# Patient Record
Sex: FEMALE | Race: WHITE | NOT HISPANIC OR LATINO | URBAN - METROPOLITAN AREA
[De-identification: names, ages, dates, MRNs, and addresses within clinical notes are randomized per-mention and may not be internally consistent; named-entity substitution may affect disease eponyms.]

---

## 2023-09-21 PROBLEM — M32.9 SLE (SYSTEMIC LUPUS ERYTHEMATOSUS RELATED SYNDROME) (MULTI): Status: ACTIVE | Noted: 2023-09-21

## 2023-09-21 PROBLEM — G89.29 OTHER CHRONIC PAIN: Status: ACTIVE | Noted: 2023-09-21

## 2023-09-21 PROBLEM — M05.79 SEROPOSITIVE RHEUMATOID ARTHRITIS OF MULTIPLE JOINTS (MULTI): Status: ACTIVE | Noted: 2023-09-21

## 2023-09-21 PROBLEM — M32.9 SYSTEMIC LUPUS ERYTHEMATOSUS (MULTI): Status: ACTIVE | Noted: 2023-09-21

## 2023-09-21 PROBLEM — F41.9 ANXIETY: Status: ACTIVE | Noted: 2023-09-21

## 2023-09-21 PROBLEM — R93.89 ABNORMAL MRI: Status: ACTIVE | Noted: 2023-09-21

## 2023-09-21 PROBLEM — E55.9 VITAMIN D DEFICIENCY: Status: ACTIVE | Noted: 2023-09-21

## 2023-09-21 PROBLEM — M54.41 LUMBAGO WITH SCIATICA, RIGHT SIDE: Status: ACTIVE | Noted: 2023-09-21

## 2023-09-21 PROBLEM — K59.00 CONSTIPATION: Status: ACTIVE | Noted: 2023-09-21

## 2023-09-21 PROBLEM — M79.7 FIBROMYALGIA: Status: ACTIVE | Noted: 2023-09-21

## 2023-09-21 RX ORDER — DOCUSATE SODIUM 100 MG/1
CAPSULE, LIQUID FILLED ORAL
COMMUNITY

## 2023-09-21 RX ORDER — PREGABALIN 300 MG/1
CAPSULE ORAL
COMMUNITY
Start: 2023-03-20

## 2023-09-21 RX ORDER — SULFAMETHOXAZOLE AND TRIMETHOPRIM 800; 160 MG/1; MG/1
TABLET ORAL
COMMUNITY
Start: 2023-01-04

## 2023-09-21 RX ORDER — DEXTROAMPHETAMINE SACCHARATE, AMPHETAMINE ASPARTATE MONOHYDRATE, DEXTROAMPHETAMINE SULFATE AND AMPHETAMINE SULFATE 6.25; 6.25; 6.25; 6.25 MG/1; MG/1; MG/1; MG/1
CAPSULE, EXTENDED RELEASE ORAL
COMMUNITY

## 2023-09-21 RX ORDER — NITROFURANTOIN 25; 75 MG/1; MG/1
CAPSULE ORAL
COMMUNITY
Start: 2022-10-12

## 2023-09-21 RX ORDER — ALUMINUM CHLORIDE 20 %
SOLUTION, NON-ORAL TOPICAL
COMMUNITY

## 2023-09-21 RX ORDER — PREDNISONE 10 MG/1
TABLET ORAL
COMMUNITY
Start: 2023-06-12

## 2023-09-21 RX ORDER — CYCLOBENZAPRINE HCL 10 MG
TABLET ORAL
COMMUNITY
Start: 2022-12-30

## 2023-09-21 RX ORDER — NIRMATRELVIR AND RITONAVIR 300-100 MG
KIT ORAL
COMMUNITY
Start: 2022-12-20

## 2023-09-21 RX ORDER — PREGABALIN 200 MG/1
CAPSULE ORAL
COMMUNITY
Start: 2023-08-03 | End: 2024-02-02 | Stop reason: SDUPTHER

## 2023-09-21 RX ORDER — ONDANSETRON 4 MG/1
TABLET, FILM COATED ORAL
COMMUNITY
Start: 2022-08-31

## 2023-09-21 RX ORDER — LORAZEPAM 1 MG/1
TABLET ORAL
COMMUNITY

## 2023-09-21 RX ORDER — FOLIC ACID 1 MG/1
TABLET ORAL
COMMUNITY
Start: 2022-08-31 | End: 2024-02-02 | Stop reason: SDUPTHER

## 2023-09-21 RX ORDER — DULOXETIN HYDROCHLORIDE 20 MG/1
CAPSULE, DELAYED RELEASE ORAL
COMMUNITY
Start: 2023-08-02 | End: 2024-02-02 | Stop reason: SDUPTHER

## 2023-09-21 RX ORDER — METHOTREXATE 2.5 MG/1
TABLET ORAL
COMMUNITY
Start: 2022-08-31

## 2023-09-21 RX ORDER — HYDROXYCHLOROQUINE SULFATE 200 MG/1
TABLET, FILM COATED ORAL
COMMUNITY
End: 2024-02-02 | Stop reason: SDUPTHER

## 2023-09-21 RX ORDER — ACETAMINOPHEN 500 MG
TABLET ORAL
COMMUNITY

## 2023-09-21 RX ORDER — DULOXETIN HYDROCHLORIDE 60 MG/1
CAPSULE, DELAYED RELEASE ORAL
COMMUNITY

## 2024-02-02 ENCOUNTER — TELEPHONE (OUTPATIENT)
Dept: RHEUMATOLOGY | Facility: CLINIC | Age: 54
End: 2024-02-02
Payer: COMMERCIAL

## 2024-02-02 DIAGNOSIS — M35.3 POLYMYALGIA RHEUMATICA SYNDROME (MULTI): ICD-10-CM

## 2024-02-02 DIAGNOSIS — M45.9 ANKYLOSING SPONDYLITIS, UNSPECIFIED SITE OF SPINE (MULTI): ICD-10-CM

## 2024-02-02 NOTE — TELEPHONE ENCOUNTER
PT CALLED, STATES SHE IS WAITING FOR MD TO SCHEDULE A PHONE APPT IN MARCH WHEN SHE WILL HAVE INS AGAIN. ALSO REQUESTING REFILL OF FOLIC ACID

## 2024-02-02 NOTE — TELEPHONE ENCOUNTER
Patient called back and added HCQ and Duloxetine and Pregabalin pharmacy is [facility] in [address].

## 2024-02-08 NOTE — TELEPHONE ENCOUNTER
Please verify the doses she is taking for the medicines before refilling and also check if 30 or 90 day supply. thanks

## 2024-02-08 NOTE — TELEPHONE ENCOUNTER
Duloxetine is 90 day 20 mg every day  Folic acid  30 day 1 mg every day    mg  30 day BID   Lyrica 200 mg   30 day  1cap 1-3 hrs before bed

## 2024-02-08 NOTE — TELEPHONE ENCOUNTER
Patient  called would like Folic acid  sent to [facility] and she just picked up the last refill of the other medication so she does not need them today. Just folic acid

## 2024-02-17 RX ORDER — DULOXETIN HYDROCHLORIDE 20 MG/1
CAPSULE, DELAYED RELEASE ORAL
Qty: 90 CAPSULE | Refills: 3 | Status: SHIPPED | OUTPATIENT
Start: 2024-02-17

## 2024-02-17 RX ORDER — HYDROXYCHLOROQUINE SULFATE 200 MG/1
TABLET, FILM COATED ORAL
Qty: 60 TABLET | Refills: 11 | Status: SHIPPED | OUTPATIENT
Start: 2024-02-17

## 2024-02-17 RX ORDER — PREGABALIN 200 MG/1
CAPSULE ORAL
Qty: 90 CAPSULE | Refills: 3 | Status: SHIPPED | OUTPATIENT
Start: 2024-02-17

## 2024-02-17 RX ORDER — FOLIC ACID 1 MG/1
TABLET ORAL
Qty: 90 TABLET | Refills: 3 | Status: SHIPPED | OUTPATIENT
Start: 2024-02-17

## 2024-05-21 ENCOUNTER — TELEPHONE (OUTPATIENT)
Dept: RHEUMATOLOGY | Facility: CLINIC | Age: 54
End: 2024-05-21
Payer: COMMERCIAL

## 2024-05-21 DIAGNOSIS — M05.79 SEROPOSITIVE RHEUMATOID ARTHRITIS OF MULTIPLE JOINTS (MULTI): Primary | ICD-10-CM

## 2024-05-21 NOTE — TELEPHONE ENCOUNTER
Patient called left voice mail that she thinks she is in a Lupus flare and wanted to know what she can take. Please advise

## 2024-05-22 RX ORDER — METHYLPREDNISOLONE 4 MG/1
TABLET ORAL
Qty: 21 TABLET | Refills: 0 | Status: SHIPPED
Start: 2024-05-22 | End: 2024-05-22

## 2024-05-22 RX ORDER — METHYLPREDNISOLONE 4 MG/1
TABLET ORAL
Qty: 21 TABLET | Refills: 1 | Status: SHIPPED | OUTPATIENT
Start: 2024-05-22

## 2024-05-23 NOTE — TELEPHONE ENCOUNTER
Patient also stated that she has swollen gland just on the right.  And she also stated she has been waiting for a telephone call from October November. Please advise when we can put her on for follow up call.

## 2024-05-28 NOTE — TELEPHONE ENCOUNTER
5/24/24 CALLED PHARMACY & THEY HAD Rx. PT CALLED & HAD IT TRANSFERRED TO THE P[PHARMACY SHE WANTED

## 2024-08-05 ENCOUNTER — TELEPHONE (OUTPATIENT)
Dept: RHEUMATOLOGY | Facility: CLINIC | Age: 54
End: 2024-08-05
Payer: COMMERCIAL

## 2024-08-05 ENCOUNTER — TRANSCRIBE ORDERS (OUTPATIENT)
Dept: RHEUMATOLOGY | Facility: CLINIC | Age: 54
End: 2024-08-05
Payer: COMMERCIAL

## 2024-08-05 DIAGNOSIS — E55.9 VITAMIN D DEFICIENCY: ICD-10-CM

## 2024-08-05 DIAGNOSIS — Z79.899 ENCOUNTER FOR LONG-TERM (CURRENT) USE OF MEDICATIONS: ICD-10-CM

## 2024-08-05 DIAGNOSIS — M35.3 POLYMYALGIA RHEUMATICA SYNDROME (MULTI): ICD-10-CM

## 2024-08-05 DIAGNOSIS — M32.9 SLE (SYSTEMIC LUPUS ERYTHEMATOSUS RELATED SYNDROME) (MULTI): ICD-10-CM

## 2024-08-05 DIAGNOSIS — M05.79 SEROPOSITIVE RHEUMATOID ARTHRITIS OF MULTIPLE JOINTS (MULTI): ICD-10-CM

## 2024-08-05 DIAGNOSIS — M45.9 ANKYLOSING SPONDYLITIS, UNSPECIFIED SITE OF SPINE (MULTI): ICD-10-CM

## 2024-08-05 NOTE — TELEPHONE ENCOUNTER
PT CALLED WAS TO HAVE PHONE VISIT & NEVER GET A CALL.  WANTS TO R/S. ALSO WANTS RENEWAL FOR HANDICAP PLACARD HER PCP HAD GIVEN HER 5 YRS AGO

## 2024-08-19 ENCOUNTER — TELEPHONE (OUTPATIENT)
Dept: RHEUMATOLOGY | Facility: CLINIC | Age: 54
End: 2024-08-19
Payer: COMMERCIAL

## 2024-08-19 DIAGNOSIS — Z79.899 ENCOUNTER FOR LONG-TERM (CURRENT) USE OF MEDICATIONS: Primary | ICD-10-CM

## 2024-08-19 NOTE — TELEPHONE ENCOUNTER
PT CALLED VECTRA IS NOT COVERED ON INS, VIT D 25 NOR VIT D 1.25  NOT COVERED WITH E55.9  DIAGNOSIS CODE. PT WILL DECIDE WHETHER OR NOT TO GET VECTRA, HOW DESPERATE DO YOU WANT IT PER PT?

## 2024-08-20 NOTE — TELEPHONE ENCOUNTER
PT CALLED BACK TODAY & SAID TO FORGET ALL THE CALLS & MESSAGES FROM YESTERDAY. SHE HAD BLOOD WORK DONE THIS AM

## 2024-09-13 ENCOUNTER — TELEPHONE (OUTPATIENT)
Dept: RHEUMATOLOGY | Facility: CLINIC | Age: 54
End: 2024-09-13
Payer: COMMERCIAL

## 2024-09-13 DIAGNOSIS — M05.79 SEROPOSITIVE RHEUMATOID ARTHRITIS OF MULTIPLE JOINTS (MULTI): ICD-10-CM

## 2024-09-13 RX ORDER — METHYLPREDNISOLONE 4 MG/1
TABLET ORAL
Qty: 21 TABLET | Refills: 1 | Status: SHIPPED | OUTPATIENT
Start: 2024-09-13

## 2024-09-13 NOTE — TELEPHONE ENCOUNTER
Medrol dose pack sent for flares/as needed.   Re referral, it is out of system, need to send a fax if not done yet.

## 2024-09-13 NOTE — TELEPHONE ENCOUNTER
PT CALLED, FEELS LIKE IN A FLARE AGAIN, HAS MOUTH SORES, ROOF, GUMS , & LIPS. CAN YOU GIVE HER ANYTHING. ALSO STATES SHE YOU WERE GOING TO CALL IN [place]. ALSO YOU WERE REFERRING TO [facility] FOR [facility]? SHE HAS NOT HEARD ANYTHING FROM THEM. PLEASE ADVISE

## 2024-09-22 DIAGNOSIS — M35.3 POLYMYALGIA RHEUMATICA SYNDROME (MULTI): ICD-10-CM

## 2024-09-22 DIAGNOSIS — M45.9 ANKYLOSING SPONDYLITIS, UNSPECIFIED SITE OF SPINE (MULTI): ICD-10-CM

## 2024-09-23 ENCOUNTER — TELEPHONE (OUTPATIENT)
Dept: RHEUMATOLOGY | Facility: CLINIC | Age: 54
End: 2024-09-23
Payer: COMMERCIAL

## 2024-09-23 RX ORDER — PREGABALIN 200 MG/1
CAPSULE ORAL
Qty: 90 CAPSULE | Refills: 3 | Status: SHIPPED | OUTPATIENT
Start: 2024-09-23

## 2024-10-30 NOTE — TELEPHONE ENCOUNTER
There was a paper referral order done previously. Unable to find it in media tab. Please send referral request for [facility]- reason back pain. Thanks

## 2024-11-22 ENCOUNTER — TELEPHONE (OUTPATIENT)
Dept: RHEUMATOLOGY | Facility: CLINIC | Age: 54
End: 2024-11-22
Payer: COMMERCIAL

## 2025-01-09 RX ORDER — EZETIMIBE 10 MG/1
1 TABLET ORAL
COMMUNITY
Start: 2024-12-16

## 2025-01-09 RX ORDER — CHLORHEXIDINE GLUCONATE ORAL RINSE 1.2 MG/ML
SOLUTION DENTAL
COMMUNITY
Start: 2024-11-12

## 2025-01-09 RX ORDER — MIRTAZAPINE 15 MG/1
TABLET, FILM COATED ORAL
COMMUNITY
Start: 2024-08-21

## 2025-01-31 ENCOUNTER — OFFICE VISIT (OUTPATIENT)
Dept: RHEUMATOLOGY | Facility: CLINIC | Age: 55
End: 2025-01-31
Payer: COMMERCIAL

## 2025-01-31 VITALS
OXYGEN SATURATION: 99 % | HEART RATE: 76 BPM | BODY MASS INDEX: 27.81 KG/M2 | DIASTOLIC BLOOD PRESSURE: 85 MMHG | WEIGHT: 157 LBS | SYSTOLIC BLOOD PRESSURE: 120 MMHG

## 2025-01-31 DIAGNOSIS — R91.8 LUNG NODULES: ICD-10-CM

## 2025-01-31 DIAGNOSIS — M32.9 SLE (SYSTEMIC LUPUS ERYTHEMATOSUS RELATED SYNDROME) (MULTI): ICD-10-CM

## 2025-01-31 DIAGNOSIS — M05.79 SEROPOSITIVE RHEUMATOID ARTHRITIS OF MULTIPLE JOINTS (MULTI): Primary | ICD-10-CM

## 2025-01-31 DIAGNOSIS — E55.9 VITAMIN D DEFICIENCY: ICD-10-CM

## 2025-01-31 DIAGNOSIS — Z79.899 ENCOUNTER FOR LONG-TERM (CURRENT) USE OF MEDICATIONS: ICD-10-CM

## 2025-01-31 PROCEDURE — 99215 OFFICE O/P EST HI 40 MIN: CPT | Performed by: INTERNAL MEDICINE

## 2025-01-31 ASSESSMENT — ROUTINE ASSESSMENT OF PATIENT INDEX DATA (RAPID3)
WASH_DRY_BODY: WITHOUT ANY DIFFICULTY
IN_OUT_TRANSPORT: WITH MUCH DIFFICULTY
SEVERITY_SCORE: HIGH SEVERITY (HS)
SUM OF QUESTIONS A TO J: 13
WEIGHTED_TOTAL_SCORE: 5.1
PICK_CLOTHES_OFF_FLOOR: WITH MUCH DIFFICULTY
TURN_FAUCETS_OFF: WITHOUT ANY DIFFICULTY
WALK_KILOMETERS: UNABLE TO DO
FEELINGS_ANXIETY_NERVOUS: WITH SOME DIFFICULTY
ON A SCALE OF ONE TO TEN, CONSIDERING ALL THE WAYS IN WHICH ILLNESS AND HEALTH CONDITIONS MAY AFFECT YOU AT THIS TIME, PLEASE INDICATE BELOW HOW YOU ARE DOING:: 7.5
FEELINGS_DEPRESSION: WITH SOME DIFFICULTY
ON A SCALE OF ONE TO TEN, HOW MUCH PAIN HAVE YOU HAD BECAUSE OF YOUR CONDITION OVER THE PAST WEEK?: 3.5
FN_SCORE: 4.3
ON A SCALE OF ONE TO TEN, HOW MUCH PAIN HAVE YOU HAD BECAUSE OF YOUR CONDITION OVER THE PAST WEEK?: 3.5
ON A SCALE OF ONE TO TEN, CONSIDERING ALL THE WAYS IN WHICH ILLNESS AND HEALTH CONDITIONS MAY AFFECT YOU AT THIS TIME, PLEASE INDICATE BELOW HOW YOU ARE DOING:: 7.5
TOTAL RAPID3 SCORE: 15.3
WALK_FLAT_GROUND: WITH MUCH DIFFICULTY
DRESS_YOURSELF: WITHOUT ANY DIFFICULTY
LIFT_CUP_TO_MOUTH: WITHOUT ANY DIFFICULTY
PARTIPATE_RECREATIONAL_ACTIVITIES: UNABLE TO DO
IN_OUT_BED: WITH SOME DIFFICULTY
GOOD_NIGHTS_SLEEP: UNABLE TO DO
SEVERITY_SCORE: 0

## 2025-01-31 NOTE — PROGRESS NOTES
[facility]/  Rheumatology  [address], [address]  [place] [address]  Phone: [phone]  Fax: [phone]    Rheumatology Progress Note 01/31/2025     Elsi Hicks is a 54 y.o. female here for   Chief Complaint   Patient presents with    Follow-up    Med Refill       Last Visit:     Rheum Hx      HPI:     Patient states she was referred by PCP kelly Charles. Patient has seen Rheum in [facility].   SOB issues for awhile.   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  In last 6 months   Advair and Proair  6 min walk test- ok        Sores in mouth  Bloating issues  Colonoscopy years ago. Saw GI about 1 month ago.     Followed by Pulm Dr Kennedy- Mild persistent asthma, restrictive lung disease- on pulm tx and not on O2     Painful nodules- itchy      FHx: mother- scalp  Mother passed away from lung cancer in Jan2018 9/12/2018  RF 22.3   CCP neg    11/16/2018 AVISE  + Antiphophatidylserine/prothrombin IgG positive; IgM neg  Anti dsDNA positive; neg crithidia  JENSEN IgG strong positive  Fulfills criteria for RA and SLE.  Elevated protrombin IgG without rest of APS serologies or hx of VTE. Reassess and consider ASA 81 mg daily.  TPMT2 Specimen Whole Blood   TPMT Genotype *3A/Neg *   NUDT15 Genotype Neg/Neg   TPMT2 Interpretation See Note   Dose reduction of thiopurine drugs may be One decreased function allele was identified, suggesting   susceptibility to dose-related toxicity from standard doses   of thiopurine drugs.   Component      Latest Ref Rng & Units 11/12/2018 11/7/2018   MANISH SSA (RO) Ab      0 - 40 AU/mL 3     JEREMIAH-1 ANTIBODY      0 - 40 AU/mL 0     Anti-RNP      0 - 40 AU/mL 0     SAE1 Antibody      NA Negative     NXP-2 Antibody      NA Negative     MDA5 Antibody      NA Negative     TIF1-Gamma Antibody      NA Negative     MI-2 Antibody      Negative NA Negative     P155/140 Antibody      Negative NA Negative     PL-12 Antibody       Negative NA Negative     PL-7 Antibody      Negative NA Negative     OJ Antibody      Negative NA Negative     EJ Antibody      Negative NA Negative     SRP Antibody      Negative NA Negative     KU AB      Negative NA Negative     PM/Scl-100 Antibody, IgG      Negative NA Negative     U2 SMALL NUCLEAR RNP AB      Negative NA Negative     SSA-60AB, IGG      0 - 40 AU/mL 25     U3, RNP AB, IGG      Negative NA Negative     Myositis Panel Interpretation      NA See Note     Appearance      Clear NA   CLEAR   Color, UA      Lt. Yellow NA   YELLOW   Specific Gravity, Urine      1.005 - 1.030 NA   1.012   pH, Urine      5.0 - 8.0 NA   7.5   LEUKOCYTES, UA      Negative NA   1+   Nitrite, Urine      Negative NA   NEG   Total Protein, Urine      Negative mg/dL   NEG   Glucose, UA      Negative mg/dL   NEG   Ketones, Urine      Negative mg/dL   NEG   Urobilinogen, Urine      0 - 1 mg/dL   0.2   Bilirubin, Urine      Negative NA   NEG   Occult Blood,Urine      Negative RBC/uL   1+   Total Protein      6.3 - 8.2 g/dL 6.5     Albumin      3.1 - 5.2 g/dL 4.0     Alpha-1-Globulin      0.2 - 0.4 g/dL 0.3     Alpha 2 Globulin      0.5 - 1.0 g/dL 0.7     Beta      0.6 - 1.0 g/dL 0.7     Gamma      0.4 - 1.4 g/dL 0.8     SPE Interp.      NA Normal Pattern     REVIEWED BY      TORIE Sanchez,PhD     RELEASED BY      NA see below     WBC, UA      0 - 5 /[HPF]   5   RBC, UA      0 - 2 /[HPF]   5   Epithelial Cells      3 - 5 /[HPF]   TRACE   Bacteria, UA      Negative NA   NEGATIVE   Hyaline Casts, UA      0 - 1 /[LPF]   0   IgG 1      240 - 1118 mg/dL 306     IgG 2      124 - 549 mg/dL 365     IgG 3      21 - 134 mg/dL 31     IgG 4      1 - 123 mg/dL 25     C-ANCA      Not-Detected titer NOT DETECTED     p-ANCA Titer      Not-Detected titer NOT DETECTED     Angio Convert Enzyme      9 - 67 U/L 18     Vit D, 1,25-Dihydroxy      19.9 - 79.3 pg/mL 75.3     CRP      0.0 - 6.0 mg/L 16.9 (H)     Sed Rate      0 - 20 mm/h 18     Uric Acid       2.5 - 8.5 mg/dL 5.3     Total CK      30 - 170 U/L 92     Ferritin      8 - 252 ng/mL 22     Hepatitis B Surface Ag      Not-Detected NA NOT DETECTED     Hepatitis C Ab      Not-Detected NA NOT DETECTED     HIV 1+2 [id number] AG, EIA      Nonreactive NA NONREACTIVE     Quantiferon(r) TB Gold (Incubated)      Negative NA Negative     HLA-B27      Negative NA See Note        Comments:            The HLAB27 phenotyping result by flow cytometry is             indeterminate.              Ankylosing Spondylitis (HLA-B27) Genotyping     Ankylosing Spondylitis (HLAB27) Specimen: Whole Blood   Ankylosing Spondylitis (HLAB27): Negative     Indication for testing: Assess genetic risk for ankylosing   spondylitis.     The sample is negative for HLA-B27.     Previous Tx  HCQ- since 2019  Gabapentin  Lyrica    Health Maintenance  DXA  Malignancy Hx  Immunization History   Administered Date(s) Administered    COVID-19, mRNA, LNP-S, PF, 30 mcg/0.3 mL dose 12/01/2021    Pfizer Purple Cap SARS-CoV-2 03/31/2021, 04/21/2021    Zoster vaccine, recombinant, adult (SHINGRIX) 06/01/2018, 06/15/2018, 11/01/2018          Past Medical History:   Diagnosis Date    Asthma action plan declined (HHS-HCC)     Activity induced    Bipolar 1 disorder (Multi)     Fibromyalgia     Lupus     PTSD (post-traumatic stress disorder)     Rheumatoid arthritis       Past Surgical History:   Procedure Laterality Date    CHOLECYSTECTOMY      ENDOMETRIAL ABLATION      HYSTERECTOMY      KNEE ARTHROSCOPY W/ MENISCAL REPAIR Left     SALPINGECTOMY        Current Outpatient Medications   Medication Sig Dispense Refill    aluminum chloride (Drysol Dab-O-Matic) 20 % external solution 1 application at bedtime Externally      amphetamine-dextroamphetamine XR (Adderall XR) 15 mg 24 hr capsule Take 1 capsule (15 mg) by mouth once daily in the morning. Take before meals.      amphetamine-dextroamphetamine XR (Adderall XR) 25 mg 24 hr capsule 1 capsule in the morning Orally  Once a day      chlorhexidine (Peridex) 0.12 % solution       cholecalciferol (Vitamin D-3) 50 mcg (2,000 unit) capsule 1 capsule Orally Once a day for 30 day(s)      cyclobenzaprine (Flexeril) 10 mg tablet 1 tablet at bedtime as needed Orally Once a day for 30 day(s)      docusate sodium (Colace) 100 mg capsule 1 capsule as needed Orally Once a day for 30 day(s)      DULoxetine (Cymbalta) 20 mg DR capsule 1 capsule Orally Once a day for 90 day(s) 90 capsule 3    DULoxetine (Cymbalta) 60 mg DR capsule 1 capsule Orally Once a day for 30 day(s)      estradiol (Estrace) 1 mg tablet       ezetimibe (Zetia) 10 mg tablet Take 1 tablet (10 mg) by mouth early in the morning..      folic acid (Folvite) 1 mg tablet 1 tablet Orally Once a day for 30 day(s) 90 tablet 3    hydroxychloroquine (PlaqueniL) 200 mg tablet 200 mg Orally twice daily for 30 days 60 tablet 11    LORazepam (Ativan) 1 mg tablet 1 tablet at bedtime as needed Orally Once a day      methotrexate (Trexall) 2.5 mg tablet 6 tablets (15 mg) Orally every 7 days      methylPREDNISolone (Medrol Dospak) 4 mg tablets Follow schedule on package instructions 21 tablet 1    mirtazapine (Remeron) 15 mg tablet       nirmatrelvir-ritonavir (Paxlovid) 300 mg (150 mg x 2)-100 mg tablet therapy pack 2 tablets nirmatrelvir and 1 tablet ritonavir Orally twice daily for 5 days      nitrofurantoin, macrocrystal-monohydrate, (Macrobid) 100 mg capsule 1 capsule with food Orally twice a day for 5 day(s)      ondansetron (Zofran) 4 mg tablet 1 tablet Orally every 8 hours as needed for nausea for 30 day(s)      predniSONE (Deltasone) 10 mg tablet 1 tablet Orally Once a day for 30 day(s)      pregabalin (Lyrica) 200 mg capsule TAKE 1 CAPSULE BY MOUTH EVERY NIGHT 1 TO 3 HOURS BEFORE BEDTIME 90 capsule 3    pregabalin (Lyrica) 300 mg capsule 1 capsule in the evening 1 to 3 hours before bedtime Orally Twice a day for 30 days      sulfamethoxazole-trimethoprim (Bactrim DS) 800-160 mg tablet  "1 tablet Orally Twice a day for 7 day(s)       No current facility-administered medications for this visit.      Allergies   Allergen Reactions    Adhesive Unknown    Belimumab Unknown    Hydroxychloroquine Hives    Levetiracetam Unknown and Other     Balance and mood issues   Balance and Mood    Balance and mood issues    Balance and Mood    Lidocaine Unknown, Headache and Other     headaches   Headache and Sore Throat    headaches    Headache and Sore Throat    Oxycodone Nausea/vomiting    Oxycodone-Acetaminophen Unknown, Headache, Nausea/vomiting and Other     Severe headches, throwing up   Severe headches, throwing up    Severe headches, throwing up    Silver Other     Pt came in for a nuclear bone scan injection, was dressed with tegaderm when she left. When pt returned for her imaging 3 hrs later she showed the nuclear tech her arm where the tegaderm was applied and noticed how red the RAC area was & it was stinging and burning, no rash and no itching. She wanted the tech to document in her chart this reaction, I advised her also to let her doctor know of this reaction as well.     Pt came in for a nuclear bone scan injection, was dressed with tegaderm when she left. When pt returned for her imaging 3 hrs later she showed the nuclear tech her arm where the tegaderm was applied and noticed how red the RAC area was & it was stinging and burning, no rash and no itching. She wanted the tech to document in her chart this reaction, I advised her also to let her doctor know of this reaction as well.    Wound Dressings Unknown    Ziprasidone Hcl Unknown    Desmopressin Unknown and Rash     Motor hesitation   Motor hesitation    Motor hesitation    Oxcarbazepine Rash     Other reaction(s): Unknown    Ziprasidone Rash, Unknown, Other and Seizure     Began seizures   Other reaction(s): Unknown    Other reaction(s): Other: See Comments   \"seizures\"   \"seizures\"    \"seizures\"        Visit Vitals  /85   Pulse 76   Wt " 71.2 kg (157 lb)   SpO2 99%   BMI 27.81 kg/m²   Smoking Status Never   BSA 1.78 m²              Rapid 3  Function Score (FN): 4.3  Pain Score (PN) (0-10): 3.5  Patient Global (PTGL) (0-10): 7.5  Rapid3 Score: 15.3  RAPID3 Weighted Score: 5.1       Workup  1/8/25  WBC wnl  UA neg blood or prot  ESR 4  C3 121  C4 28  ACE 44  B6 18.2  Vit D1,25 63.2  dsDNA 1  IL-6 <2.5  Vectra 34 (moderate)  Uric acid 4.3    SHAN- no M protein  IgG subclasses IgG1 borderline low 244  IgG2 309, IgG3 33, IgG4 22  Creat 1.24, GFR 51  ALT 64, AST 47, Alk phos 89    Assessment/Plan  1. Seropositive rheumatoid arthritis of multiple joints (Multi)    2. SLE (systemic lupus erythematosus related syndrome) (Multi)    3. Vitamin D deficiency    4. Encounter for long-term (current) use of medications       No orders of the defined types were placed in this encounter.             Since last appt, adherent and tolerating  mg daily  Started on Zetia by PCP in Dec for high cholesterol. Told to stay off of it.  Following with Nephrology- for possible biopsy.  Saw Ortho and had MRIs and EM   Denies any recent or current infection.  Not on any NSAIDs or glucocorticoids.  ROS+ for   Rapid 3 consistent with high severity.  Labs reviewed  D/w pt and decided on tx options.  Advised of possible side effects and importance of monitoring.   All questions answered.  Patient to follow up with primary care provider regarding all other medical issues not addressed today and for medical chart updating.    Svetlana Painting MD      No care team member to display

## 2025-02-13 ENCOUNTER — TELEPHONE (OUTPATIENT)
Dept: RHEUMATOLOGY | Facility: CLINIC | Age: 55
End: 2025-02-13
Payer: COMMERCIAL

## 2025-02-13 DIAGNOSIS — M35.3 POLYMYALGIA RHEUMATICA SYNDROME (MULTI): ICD-10-CM

## 2025-02-13 DIAGNOSIS — M45.9 ANKYLOSING SPONDYLITIS, UNSPECIFIED SITE OF SPINE (MULTI): ICD-10-CM

## 2025-02-18 ENCOUNTER — SPECIALTY PHARMACY (OUTPATIENT)
Dept: PHARMACY | Facility: CLINIC | Age: 55
End: 2025-02-18

## 2025-02-18 RX ORDER — PREGABALIN 200 MG/1
CAPSULE ORAL
Qty: 90 CAPSULE | Refills: 3 | Status: SHIPPED | OUTPATIENT
Start: 2025-02-18 | End: 2025-02-21 | Stop reason: SDUPTHER

## 2025-02-18 RX ORDER — FOLIC ACID 1 MG/1
TABLET ORAL
Qty: 90 TABLET | Refills: 3 | Status: SHIPPED | OUTPATIENT
Start: 2025-02-18

## 2025-02-18 RX ORDER — HYDROXYCHLOROQUINE SULFATE 200 MG/1
TABLET, FILM COATED ORAL
Qty: 180 TABLET | Refills: 1 | Status: SHIPPED | OUTPATIENT
Start: 2025-02-18

## 2025-02-19 ENCOUNTER — SPECIALTY PHARMACY (OUTPATIENT)
Dept: PHARMACY | Facility: CLINIC | Age: 55
End: 2025-02-19

## 2025-02-20 ENCOUNTER — TELEPHONE (OUTPATIENT)
Dept: RHEUMATOLOGY | Facility: CLINIC | Age: 55
End: 2025-02-20
Payer: COMMERCIAL

## 2025-02-20 DIAGNOSIS — M45.9 ANKYLOSING SPONDYLITIS, UNSPECIFIED SITE OF SPINE (MULTI): ICD-10-CM

## 2025-02-20 DIAGNOSIS — M35.3 POLYMYALGIA RHEUMATICA SYNDROME (MULTI): ICD-10-CM

## 2025-02-21 RX ORDER — PREGABALIN 200 MG/1
CAPSULE ORAL
Qty: 90 CAPSULE | Refills: 3 | Status: SHIPPED | OUTPATIENT
Start: 2025-02-21

## 2025-02-21 NOTE — TELEPHONE ENCOUNTER
----- Message from Nurse Maria C BACA sent at 2/19/2025  2:34 PM EST -----  Regarding: FW: Patient TD    ----- Message -----  From: Damaris A Weiland, PharmD  Sent: 2/19/2025   1:47 PM EST  To: Maria C Humphrey LPN; [facility] Rheumatology Team  Subject: Patient TD                                       Good afternoon,    The patient is requesting her Lyrica RX be sent to [id number] -   [address] [address]  Phone: [phone]  Fax: [phone]    We cannot transfer this to [facility] since this is a controlled substance and has never been filled. Can you please resend to [facility]?    Thanks!  Damaris Damaris Weiland, PharmD, [facility]  Clinical Pharmacist  [address], [place] [address]  Phone: [phone]  Fax: [phone]

## 2025-05-30 ENCOUNTER — APPOINTMENT (OUTPATIENT)
Dept: RHEUMATOLOGY | Facility: CLINIC | Age: 55
End: 2025-05-30
Payer: COMMERCIAL

## 2025-06-04 ENCOUNTER — APPOINTMENT (OUTPATIENT)
Dept: RHEUMATOLOGY | Facility: CLINIC | Age: 55
End: 2025-06-04
Payer: COMMERCIAL

## 2025-06-11 ENCOUNTER — APPOINTMENT (OUTPATIENT)
Dept: RHEUMATOLOGY | Facility: CLINIC | Age: 55
End: 2025-06-11
Payer: COMMERCIAL

## 2025-06-12 ENCOUNTER — APPOINTMENT (OUTPATIENT)
Facility: CLINIC | Age: 55
End: 2025-06-12
Payer: COMMERCIAL

## 2025-07-10 LAB
1,25(OH)2D SERPL-MCNC: 26 PG/ML (ref 18–72)
1,25(OH)2D2 SERPL-MCNC: <8 PG/ML
1,25(OH)2D3 SERPL-MCNC: 26 PG/ML
25(OH)D3+25(OH)D2 SERPL-MCNC: 57 NG/ML (ref 30–100)
ACE SERPL-CCNC: 23 U/L (ref 9–67)
ALBUMIN SERPL-MCNC: 4.5 G/DL (ref 3.6–5.1)
ALP SERPL-CCNC: 77 U/L (ref 37–153)
ALT SERPL-CCNC: 28 U/L (ref 6–29)
ANION GAP SERPL CALCULATED.4IONS-SCNC: 7 MMOL/L (CALC) (ref 7–17)
AST SERPL-CCNC: 27 U/L (ref 10–35)
BASOPHILS # BLD AUTO: 39 CELLS/UL (ref 0–200)
BASOPHILS NFR BLD AUTO: 0.7 %
BILIRUB SERPL-MCNC: 0.5 MG/DL (ref 0.2–1.2)
BUN SERPL-MCNC: 14 MG/DL (ref 7–25)
C3 SERPL-MCNC: 134 MG/DL (ref 83–193)
C4 SERPL-MCNC: 26 MG/DL (ref 15–57)
CALCIUM SERPL-MCNC: 9.6 MG/DL (ref 8.6–10.4)
CHLORIDE SERPL-SCNC: 105 MMOL/L (ref 98–110)
CK SERPL-CCNC: 101 U/L (ref 21–240)
CO2 SERPL-SCNC: 28 MMOL/L (ref 20–32)
CREAT SERPL-MCNC: 1.06 MG/DL (ref 0.5–1.03)
CRP SERPL-MCNC: <3 MG/L
DSDNA AB SER-ACNC: 1 IU/ML
EGFRCR SERPLBLD CKD-EPI 2021: 61 ML/MIN/1.73M2
EOSINOPHIL # BLD AUTO: 129 CELLS/UL (ref 15–500)
EOSINOPHIL NFR BLD AUTO: 2.3 %
ERYTHROCYTE [DISTWIDTH] IN BLOOD BY AUTOMATED COUNT: 12.7 % (ref 11–15)
ERYTHROCYTE [SEDIMENTATION RATE] IN BLOOD BY WESTERGREN METHOD: 6 MM/H
GLUCOSE SERPL-MCNC: 82 MG/DL (ref 65–139)
HCT VFR BLD AUTO: 42.7 % (ref 35–45)
HGB BLD-MCNC: 14.2 G/DL (ref 11.7–15.5)
IGG SERPL-MCNC: 618 MG/DL (ref 600–1640)
IGG1 SER-MCNC: 262 MG/DL (ref 382–929)
IGG2 SER-MCNC: 310 MG/DL (ref 241–700)
IGG3 SER-MCNC: 34 MG/DL (ref 22–178)
IGG4 SER-MCNC: 29.3 MG/DL (ref 4–86)
IL6 SERPL-MCNC: 1.98 PG/ML
LYMPHOCYTES # BLD AUTO: 2229 CELLS/UL (ref 850–3900)
LYMPHOCYTES NFR BLD AUTO: 39.8 %
MCH RBC QN AUTO: 32.3 PG (ref 27–33)
MCHC RBC AUTO-ENTMCNC: 33.3 G/DL (ref 32–36)
MCV RBC AUTO: 97 FL (ref 80–100)
MONOCYTES # BLD AUTO: 314 CELLS/UL (ref 200–950)
MONOCYTES NFR BLD AUTO: 5.6 %
NEUTROPHILS # BLD AUTO: 2890 CELLS/UL (ref 1500–7800)
NEUTROPHILS NFR BLD AUTO: 51.6 %
PLATELET # BLD AUTO: 215 THOUSAND/UL (ref 140–400)
PMV BLD REES-ECKER: 10.2 FL (ref 7.5–12.5)
POTASSIUM SERPL-SCNC: 4.5 MMOL/L (ref 3.5–5.3)
PROT SERPL-MCNC: 6.5 G/DL (ref 6.1–8.1)
RBC # BLD AUTO: 4.4 MILLION/UL (ref 3.8–5.1)
SODIUM SERPL-SCNC: 140 MMOL/L (ref 135–146)
URATE SERPL-MCNC: 5.5 MG/DL (ref 2.5–7)
WBC # BLD AUTO: 5.6 THOUSAND/UL (ref 3.8–10.8)

## 2025-07-18 ENCOUNTER — OFFICE VISIT (OUTPATIENT)
Facility: CLINIC | Age: 55
End: 2025-07-18
Payer: COMMERCIAL

## 2025-07-18 VITALS
DIASTOLIC BLOOD PRESSURE: 72 MMHG | SYSTOLIC BLOOD PRESSURE: 113 MMHG | WEIGHT: 155 LBS | HEIGHT: 63 IN | BODY MASS INDEX: 27.46 KG/M2 | HEART RATE: 68 BPM

## 2025-07-18 DIAGNOSIS — M05.79 SEROPOSITIVE RHEUMATOID ARTHRITIS OF MULTIPLE JOINTS (MULTI): Primary | ICD-10-CM

## 2025-07-18 DIAGNOSIS — M35.3 POLYMYALGIA RHEUMATICA SYNDROME (MULTI): ICD-10-CM

## 2025-07-18 DIAGNOSIS — M45.9 ANKYLOSING SPONDYLITIS, UNSPECIFIED SITE OF SPINE (MULTI): ICD-10-CM

## 2025-07-18 DIAGNOSIS — Z79.899 ENCOUNTER FOR LONG-TERM (CURRENT) USE OF MEDICATIONS: ICD-10-CM

## 2025-07-18 DIAGNOSIS — E55.9 VITAMIN D DEFICIENCY: ICD-10-CM

## 2025-07-18 PROCEDURE — 99215 OFFICE O/P EST HI 40 MIN: CPT | Performed by: INTERNAL MEDICINE

## 2025-07-18 PROCEDURE — 2500000004 HC RX 250 GENERAL PHARMACY W/ HCPCS (ALT 636 FOR OP/ED): Mod: JZ | Performed by: INTERNAL MEDICINE

## 2025-07-18 PROCEDURE — 3008F BODY MASS INDEX DOCD: CPT | Performed by: INTERNAL MEDICINE

## 2025-07-18 PROCEDURE — 96372 THER/PROPH/DIAG INJ SC/IM: CPT | Performed by: INTERNAL MEDICINE

## 2025-07-18 RX ORDER — METHYLPREDNISOLONE ACETATE 80 MG/ML
80 INJECTION, SUSPENSION INTRA-ARTICULAR; INTRALESIONAL; INTRAMUSCULAR; SOFT TISSUE ONCE
Status: COMPLETED | OUTPATIENT
Start: 2025-07-18 | End: 2025-07-18

## 2025-07-18 RX ORDER — DULOXETIN HYDROCHLORIDE 20 MG/1
CAPSULE, DELAYED RELEASE ORAL
Qty: 90 CAPSULE | Refills: 3 | Status: SHIPPED | OUTPATIENT
Start: 2025-07-18

## 2025-07-18 RX ORDER — HYDROXYCHLOROQUINE SULFATE 200 MG/1
200 TABLET, FILM COATED ORAL DAILY
Qty: 90 TABLET | Refills: 3 | Status: SHIPPED | OUTPATIENT
Start: 2025-07-18

## 2025-07-18 RX ADMIN — METHYLPREDNISOLONE ACETATE 80 MG: 80 INJECTION, SUSPENSION INTRA-ARTICULAR; INTRALESIONAL; INTRAMUSCULAR; SOFT TISSUE at 16:28

## 2025-07-18 ASSESSMENT — ROUTINE ASSESSMENT OF PATIENT INDEX DATA (RAPID3)
GOOD_NIGHTS_SLEEP: UNABLE TO DO
ON A SCALE OF ONE TO TEN, HOW MUCH PAIN HAVE YOU HAD BECAUSE OF YOUR CONDITION OVER THE PAST WEEK?: 4.5
DRESS_YOURSELF: WITHOUT ANY DIFFICULTY
ON A SCALE OF ONE TO TEN, CONSIDERING ALL THE WAYS IN WHICH ILLNESS AND HEALTH CONDITIONS MAY AFFECT YOU AT THIS TIME, PLEASE INDICATE BELOW HOW YOU ARE DOING:: 5
IN_OUT_BED: WITH SOME DIFFICULTY
FEELINGS_ANXIETY_NERVOUS: WITH SOME DIFFICULTY
FN_SCORE: 3.7
TOTAL RAPID3 SCORE: 13.2
WALK_KILOMETERS: WITH MUCH DIFFICULTY
IN_OUT_TRANSPORT: WITH SOME DIFFICULTY
FEELINGS_DEPRESSION: WITH SOME DIFFICULTY
WEIGHTED_TOTAL_SCORE: 4.4
WALK_FLAT_GROUND: WITH MUCH DIFFICULTY
PICK_CLOTHES_OFF_FLOOR: WITH SOME DIFFICULTY
SEVERITY_SCORE: 0
SEVERITY_SCORE: HIGH SEVERITY (HS)
ON A SCALE OF ONE TO TEN, CONSIDERING ALL THE WAYS IN WHICH ILLNESS AND HEALTH CONDITIONS MAY AFFECT YOU AT THIS TIME, PLEASE INDICATE BELOW HOW YOU ARE DOING:: 5
WASH_DRY_BODY: WITH SOME DIFFICULTY
PARTIPATE_RECREATIONAL_ACTIVITIES: UNABLE TO DO
LIFT_CUP_TO_MOUTH: WITHOUT ANY DIFFICULTY
SUM OF QUESTIONS A TO J: 11
TURN_FAUCETS_OFF: WITHOUT ANY DIFFICULTY
ON A SCALE OF ONE TO TEN, HOW MUCH PAIN HAVE YOU HAD BECAUSE OF YOUR CONDITION OVER THE PAST WEEK?: 4.5

## 2025-07-18 ASSESSMENT — PATIENT HEALTH QUESTIONNAIRE - PHQ9
SUM OF ALL RESPONSES TO PHQ9 QUESTIONS 1 AND 2: 0
2. FEELING DOWN, DEPRESSED OR HOPELESS: NOT AT ALL
1. LITTLE INTEREST OR PLEASURE IN DOING THINGS: NOT AT ALL

## 2025-07-18 ASSESSMENT — ENCOUNTER SYMPTOMS
LOSS OF SENSATION IN FEET: 0
OCCASIONAL FEELINGS OF UNSTEADINESS: 0
DEPRESSION: 0

## 2025-07-18 ASSESSMENT — PAIN SCALES - GENERAL: PAINLEVEL_OUTOF10: 4

## 2025-07-18 ASSESSMENT — COLUMBIA-SUICIDE SEVERITY RATING SCALE - C-SSRS
6. HAVE YOU EVER DONE ANYTHING, STARTED TO DO ANYTHING, OR PREPARED TO DO ANYTHING TO END YOUR LIFE?: NO
2. HAVE YOU ACTUALLY HAD ANY THOUGHTS OF KILLING YOURSELF?: NO
1. IN THE PAST MONTH, HAVE YOU WISHED YOU WERE DEAD OR WISHED YOU COULD GO TO SLEEP AND NOT WAKE UP?: NO

## 2025-07-18 NOTE — PROGRESS NOTES
[facility]/  Rheumatology  [address], [address]  [place] [address]  Phone: [phone]  Fax: [phone]    Rheumatology Progress Note 07/18/2025     Elsi Hicks is a 55 y.o. female here for   Chief Complaint   Patient presents with    Follow-up       Last Visit: 1/31/2025      Rheum Hx      HPI:     Patient states she was referred by PCP kelly Charles. Patient has seen Rheum in [facility].   SOB issues for awhile.   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  In last 6 months   Advair and Proair  6 min walk test- ok        Sores in mouth  Bloating issues  Colonoscopy years ago. Saw GI about 1 month ago.     Followed by Pulm Dr Kennedy- Mild persistent asthma, restrictive lung disease- on pulm tx and not on O2     Painful nodules- itchy      FHx: mother- scalp  Mother passed away from lung cancer in Jan2018 9/12/2018  RF 22.3   CCP neg    11/16/2018 AVISE  + Antiphophatidylserine/prothrombin IgG positive; IgM neg  Anti dsDNA positive; neg crithidia  JENSEN IgG strong positive  Fulfills criteria for RA and SLE.  Elevated protrombin IgG without rest of APS serologies or hx of VTE. Reassess and consider ASA 81 mg daily.  TPMT2 Specimen Whole Blood   TPMT Genotype *3A/Neg *   NUDT15 Genotype Neg/Neg   TPMT2 Interpretation See Note   Dose reduction of thiopurine drugs may be One decreased function allele was identified, suggesting   susceptibility to dose-related toxicity from standard doses   of thiopurine drugs.   Component      Latest Ref Rng & Units 11/12/2018 11/7/2018   MANISH SSA (RO) Ab      0 - 40 AU/mL 3     JEREMIAH-1 ANTIBODY      0 - 40 AU/mL 0     Anti-RNP      0 - 40 AU/mL 0     SAE1 Antibody      NA Negative     NXP-2 Antibody      NA Negative     MDA5 Antibody      NA Negative     TIF1-Gamma Antibody      NA Negative     MI-2 Antibody      Negative NA Negative     P155/140 Antibody      Negative NA Negative     PL-12 Antibody       Negative NA Negative     PL-7 Antibody      Negative NA Negative     OJ Antibody      Negative NA Negative     EJ Antibody      Negative NA Negative     SRP Antibody      Negative NA Negative     KU AB      Negative NA Negative     PM/Scl-100 Antibody, IgG      Negative NA Negative     U2 SMALL NUCLEAR RNP AB      Negative NA Negative     SSA-60AB, IGG      0 - 40 AU/mL 25     U3, RNP AB, IGG      Negative NA Negative     Myositis Panel Interpretation      NA See Note     Appearance      Clear NA   CLEAR   Color, UA      Lt. Yellow NA   YELLOW   Specific Gravity, Urine      1.005 - 1.030 NA   1.012   pH, Urine      5.0 - 8.0 NA   7.5   LEUKOCYTES, UA      Negative NA   1+   Nitrite, Urine      Negative NA   NEG   Total Protein, Urine      Negative mg/dL   NEG   Glucose, UA      Negative mg/dL   NEG   Ketones, Urine      Negative mg/dL   NEG   Urobilinogen, Urine      0 - 1 mg/dL   0.2   Bilirubin, Urine      Negative NA   NEG   Occult Blood,Urine      Negative RBC/uL   1+   Total Protein      6.3 - 8.2 g/dL 6.5     Albumin      3.1 - 5.2 g/dL 4.0     Alpha-1-Globulin      0.2 - 0.4 g/dL 0.3     Alpha 2 Globulin      0.5 - 1.0 g/dL 0.7     Beta      0.6 - 1.0 g/dL 0.7     Gamma      0.4 - 1.4 g/dL 0.8     SPE Interp.      NA Normal Pattern     REVIEWED BY      TORIE Sanchez,PhD     RELEASED BY      NA see below     WBC, UA      0 - 5 /[HPF]   5   RBC, UA      0 - 2 /[HPF]   5   Epithelial Cells      3 - 5 /[HPF]   TRACE   Bacteria, UA      Negative NA   NEGATIVE   Hyaline Casts, UA      0 - 1 /[LPF]   0   IgG 1      240 - 1118 mg/dL 306     IgG 2      124 - 549 mg/dL 365     IgG 3      21 - 134 mg/dL 31     IgG 4      1 - 123 mg/dL 25     C-ANCA      Not-Detected titer NOT DETECTED     p-ANCA Titer      Not-Detected titer NOT DETECTED     Angio Convert Enzyme      9 - 67 U/L 18     Vit D, 1,25-Dihydroxy      19.9 - 79.3 pg/mL 75.3     CRP      0.0 - 6.0 mg/L 16.9 (H)     Sed Rate      0 - 20 mm/h 18     Uric Acid       2.5 - 8.5 mg/dL 5.3     Total CK      30 - 170 U/L 92     Ferritin      8 - 252 ng/mL 22     Hepatitis B Surface Ag      Not-Detected NA NOT DETECTED     Hepatitis C Ab      Not-Detected NA NOT DETECTED     HIV 1+2 [id number] AG, EIA      Nonreactive NA NONREACTIVE     Quantiferon(r) TB Gold (Incubated)      Negative NA Negative     HLA-B27      Negative NA See Note        Comments:            The HLAB27 phenotyping result by flow cytometry is             indeterminate.              Ankylosing Spondylitis (HLA-B27) Genotyping     Ankylosing Spondylitis (HLAB27) Specimen: Whole Blood   Ankylosing Spondylitis (HLAB27): Negative     Indication for testing: Assess genetic risk for ankylosing   spondylitis.     The sample is negative for HLA-B27.     Previous Tx  HCQ- since 2019  Gabapentin  Lyrica    Health Maintenance  DXA  Malignancy Hx  Immunization History   Administered Date(s) Administered    COVID-19, mRNA, LNP-S, PF, 30 mcg/0.3 mL dose 03/31/2021, 04/21/2021, 12/01/2021    Zoster vaccine, recombinant, adult (SHINGRIX) 06/01/2018, 06/15/2018, 11/01/2018          Past Medical History:   Diagnosis Date    Asthma action plan declined ([facility])     Activity induced    Bipolar 1 disorder (Multi)     Fibromyalgia     Lupus     PTSD (post-traumatic stress disorder)     Rheumatoid arthritis       Past Surgical History:   Procedure Laterality Date    CHOLECYSTECTOMY      ENDOMETRIAL ABLATION      HYSTERECTOMY      KNEE ARTHROSCOPY W/ MENISCAL REPAIR Left     SALPINGECTOMY        Current Outpatient Medications   Medication Sig Dispense Refill    aluminum chloride (Drysol Dab-O-Matic) 20 % external solution 1 application at bedtime Externally      amphetamine-dextroamphetamine XR (Adderall XR) 15 mg 24 hr capsule Take 1 capsule (15 mg) by mouth once daily in the morning. Take before meals.      amphetamine-dextroamphetamine XR (Adderall XR) 25 mg 24 hr capsule 1 capsule in the morning Orally Once a day      chlorhexidine  (Peridex) 0.12 % solution       cholecalciferol (Vitamin D-3) 50 mcg (2,000 unit) capsule 1 capsule Orally Once a day for 30 day(s)      cyclobenzaprine (Flexeril) 10 mg tablet 1 tablet at bedtime as needed Orally Once a day for 30 day(s)      docusate sodium (Colace) 100 mg capsule 1 capsule as needed Orally Once a day for 30 day(s)      DULoxetine (Cymbalta) 20 mg DR capsule 1 capsule Orally Once a day for 90 day(s) 90 capsule 3    DULoxetine (Cymbalta) 60 mg DR capsule 1 capsule Orally Once a day for 30 day(s)      estradiol (Estrace) 1 mg tablet       ezetimibe (Zetia) 10 mg tablet Take 1 tablet (10 mg) by mouth early in the morning..      folic acid (Folvite) 1 mg tablet TAKE 1 TABLET BY MOUTH DAILY 90 tablet 3    hydroxychloroquine (Plaquenil) 200 mg tablet TAKE 1 TABLET BY MOUTH TWICE DAILY 180 tablet 1    LORazepam (Ativan) 1 mg tablet 1 tablet at bedtime as needed Orally Once a day      methotrexate (Trexall) 2.5 mg tablet 6 tablets (15 mg) Orally every 7 days      methylPREDNISolone (Medrol Dospak) 4 mg tablets Follow schedule on package instructions 21 tablet 1    mirtazapine (Remeron) 15 mg tablet       nirmatrelvir-ritonavir (Paxlovid) 300 mg (150 mg x 2)-100 mg tablet therapy pack 2 tablets nirmatrelvir and 1 tablet ritonavir Orally twice daily for 5 days      nitrofurantoin, macrocrystal-monohydrate, (Macrobid) 100 mg capsule 1 capsule with food Orally twice a day for 5 day(s)      ondansetron (Zofran) 4 mg tablet 1 tablet Orally every 8 hours as needed for nausea for 30 day(s)      predniSONE (Deltasone) 10 mg tablet 1 tablet Orally Once a day for 30 day(s)      pregabalin (Lyrica) 200 mg capsule TAKE 1 CAPSULE BY MOUTH EVERY NIGHT 1 TO 3 HOURS BEFORE BEDTIME 90 capsule 3    pregabalin (Lyrica) 300 mg capsule 1 capsule in the evening 1 to 3 hours before bedtime Orally Twice a day for 30 days      sulfamethoxazole-trimethoprim (Bactrim DS) 800-160 mg tablet 1 tablet Orally Twice a day for 7 day(s)    "    No current facility-administered medications for this visit.      Allergies   Allergen Reactions    Adhesive Unknown    Belimumab Unknown    Hydroxychloroquine Hives    Levetiracetam Unknown and Other     Balance and mood issues   Balance and Mood    Balance and mood issues    Balance and Mood    Lidocaine Unknown, Headache and Other     headaches   Headache and Sore Throat    headaches    Headache and Sore Throat    Oxycodone Nausea/vomiting    Oxycodone-Acetaminophen Unknown, Headache, Nausea/vomiting and Other     Severe headches, throwing up   Severe headches, throwing up    Severe headches, throwing up    Silver Other     Pt came in for a nuclear bone scan injection, was dressed with tegaderm when she left. When pt returned for her imaging 3 hrs later she showed the nuclear tech her arm where the tegaderm was applied and noticed how red the RAC area was & it was stinging and burning, no rash and no itching. She wanted the tech to document in her chart this reaction, I advised her also to let her doctor know of this reaction as well.     Pt came in for a nuclear bone scan injection, was dressed with tegaderm when she left. When pt returned for her imaging 3 hrs later she showed the nuclear tech her arm where the tegaderm was applied and noticed how red the RAC area was & it was stinging and burning, no rash and no itching. She wanted the tech to document in her chart this reaction, I advised her also to let her doctor know of this reaction as well.    Wound Dressings Unknown    Ziprasidone Hcl Unknown    Desmopressin Unknown and Rash     Motor hesitation   Motor hesitation    Motor hesitation    Oxcarbazepine Rash     Other reaction(s): Unknown    Ziprasidone Rash, Unknown, Other and Seizure     Began seizures   Other reaction(s): Unknown    Other reaction(s): Other: See Comments   \"seizures\"   \"seizures\"    \"seizures\"        Visit Vitals  /72 (BP Location: Left arm, Patient Position: Sitting, BP Cuff " "Size: Adult long)   Pulse 68   Ht 1.6 m (5' 3\")   Wt 70.3 kg (155 lb)   BMI 27.46 kg/m²   Smoking Status Never   BSA 1.77 m²              Rapid 3  Function Score (FN): 3.7  Pain Score (PN) (0-10): 4.5  Patient Global (PTGL) (0-10): 5  Rapid3 Score: 13.2  RAPID3 Weighted Score: 4.4       Workup  1/8/25  WBC wnl  UA neg blood or prot  ESR 4  C3 121  C4 28  ACE 44  B6 18.2  Vit D1,25 63.2  dsDNA 1  IL-6 <2.5  Vectra 34 (moderate)  Uric acid 4.3    SHAN- no M protein  IgG subclasses IgG1 borderline low 244  IgG2 309, IgG3 33, IgG4 22  Creat 1.24, GFR 51  ALT 64, AST 47, Alk phos 89    Assessment/Plan  No diagnosis found.     No orders of the defined types were placed in this encounter.             Since last appt, adherent and tolerating  mg daily  Started on Zetia by PCP in Dec for high cholesterol. Told to stay off of it.  Following with Nephrology- for possible biopsy.  Saw Ortho and had MRIs and EM   Denies any recent or current infection.  Not on any NSAIDs or glucocorticoids.  ROS+ for fatigue, atypical CP, ALCANTAR, abd pain/nausea, joint pain/stiffness, raynaud's without pitting/ulceration, weakness legs, numbness/tingling, depression/anxiety.  Rapid 3 consistent with high severity.  Labs reviewed  D/w pt and decided on tx options.  Advised of possible side effects and importance of monitoring.   All questions answered.  Patient to follow up with primary care provider regarding all other medical issues not addressed today and for medical chart updating.      Since last appt, adherent and tolerating***  Mechanical fall while showering. L shoulder pain . For PT  Burning and cannot sleep at night.   Saw pain mgmt and to get records.     Doing home exercises that hurt camron s=does them.Denies any recent or current infection.  Not on any NSAIDs or glucocorticoids.  ROS+ for ***  Rapid 3 consistent with ***  Labs reviewed  D/w pt tx options and decided on ***  Ok for video appt if needed.  Advised of possible " side effects and importance of monitoring.   All questions answered.  Patient to follow up with primary care provider regarding all other medical issues not addressed today and for medical chart updating.      Svetlana Painting MD      Patient Care Team:  Ricci Alexander DO as PCP - [facility] PCP

## 2025-07-23 LAB
ALBUMIN/CREAT UR: NORMAL MG/G CREAT
APPEARANCE UR: CLEAR
BACTERIA #/AREA URNS HPF: NORMAL /HPF
BACTERIA UR CULT: NORMAL
BILIRUB UR QL STRIP: NEGATIVE
COLOR UR: YELLOW
CREAT UR-MCNC: 61 MG/DL (ref 20–275)
GLUCOSE UR QL STRIP: NEGATIVE
HGB UR QL STRIP: NEGATIVE
HYALINE CASTS #/AREA URNS LPF: NORMAL /LPF
KETONES UR QL STRIP: NEGATIVE
LEUKOCYTE ESTERASE UR QL STRIP: NEGATIVE
MICROALBUMIN UR-MCNC: <0.2 MG/DL
NITRITE UR QL STRIP: NEGATIVE
PH UR STRIP: 7 [PH] (ref 5–8)
PROT UR QL STRIP: NEGATIVE
RBC #/AREA URNS HPF: NORMAL /HPF
SERVICE CMNT-IMP: NORMAL
SP GR UR STRIP: 1.01 (ref 1–1.03)
SQUAMOUS #/AREA URNS HPF: NORMAL /HPF
WBC #/AREA URNS HPF: NORMAL /HPF